# Patient Record
Sex: FEMALE | Race: ASIAN | NOT HISPANIC OR LATINO | Employment: OTHER | URBAN - METROPOLITAN AREA
[De-identification: names, ages, dates, MRNs, and addresses within clinical notes are randomized per-mention and may not be internally consistent; named-entity substitution may affect disease eponyms.]

---

## 2024-04-08 ENCOUNTER — ALLIED HEALTH/NURSE VISIT (OUTPATIENT)
Dept: FAMILY MEDICINE | Facility: CLINIC | Age: 49
End: 2024-04-08
Payer: COMMERCIAL

## 2024-04-08 DIAGNOSIS — Z23 NEED FOR COVID-19 VACCINE: Primary | ICD-10-CM

## 2024-04-08 PROCEDURE — 91320 SARSCV2 VAC 30MCG TRS-SUC IM: CPT

## 2024-04-08 PROCEDURE — 99207 PR NO CHARGE NURSE ONLY: CPT

## 2024-04-08 PROCEDURE — 90480 ADMN SARSCOV2 VAC 1/ONLY CMP: CPT

## 2024-04-08 NOTE — PROGRESS NOTES
Prior to immunization administration, verified patients identity using patient s name and date of birth. Please see Immunization Activity for additional information.     Screening Questionnaire for Adult Immunization    Are you sick today?   No   Do you have allergies to medications, food, a vaccine component or latex?   No   Have you ever had a serious reaction after receiving a vaccination?   No   Do you have a long-term health problem with heart, lung, kidney, or metabolic disease (e.g., diabetes), asthma, a blood disorder, no spleen, complement component deficiency, a cochlear implant, or a spinal fluid leak?  Are you on long-term aspirin therapy?   No   Do you have cancer, leukemia, HIV/AIDS, or any other immune system problem?   No   Do you have a parent, brother, or sister with an immune system problem?   No   In the past 3 months, have you taken medications that affect  your immune system, such as prednisone, other steroids, or anticancer drugs; drugs for the treatment of rheumatoid arthritis, Crohn s disease, or psoriasis; or have you had radiation treatments?   No   Have you had a seizure, or a brain or other nervous system problem?   No   During the past year, have you received a transfusion of blood or blood    products, or been given immune (gamma) globulin or antiviral drug?   No   For women: Are you pregnant or is there a chance you could become       pregnant during the next month?   No   Have you received any vaccinations in the past 4 weeks?   No     Immunization questionnaire answers were all negative.    I have reviewed the following standing orders:   This patient is due and qualifies for the Covid-19 vaccine.     Click here for COVID-19 Standing Order    I have reviewed the vaccines inclusion and exclusion criteria; No concerns regarding eligibility.     Patient instructed to remain in clinic for 15 minutes afterwards, and to report any adverse reactions.     Screening performed by Rose DIALLO  ALONZO Patel on 4/8/2024 at 11:29 AM.

## 2024-08-06 ENCOUNTER — OFFICE VISIT (OUTPATIENT)
Dept: URGENT CARE | Facility: URGENT CARE | Age: 49
End: 2024-08-06
Payer: COMMERCIAL

## 2024-08-06 VITALS
HEART RATE: 73 BPM | WEIGHT: 132 LBS | SYSTOLIC BLOOD PRESSURE: 148 MMHG | RESPIRATION RATE: 22 BRPM | TEMPERATURE: 98.4 F | DIASTOLIC BLOOD PRESSURE: 82 MMHG | OXYGEN SATURATION: 97 %

## 2024-08-06 DIAGNOSIS — I10 BENIGN ESSENTIAL HYPERTENSION: Primary | ICD-10-CM

## 2024-08-06 PROCEDURE — 99203 OFFICE O/P NEW LOW 30 MIN: CPT | Performed by: PHYSICIAN ASSISTANT

## 2024-08-06 RX ORDER — LOSARTAN POTASSIUM 50 MG/1
50 TABLET ORAL DAILY
Qty: 90 TABLET | Refills: 3 | Status: SHIPPED | OUTPATIENT
Start: 2024-08-06 | End: 2024-09-23 | Stop reason: DRUGHIGH

## 2024-08-06 RX ORDER — AMLODIPINE BESYLATE 5 MG/1
5 TABLET ORAL DAILY
Qty: 90 TABLET | Refills: 3 | Status: SHIPPED | OUTPATIENT
Start: 2024-08-06 | End: 2024-09-23 | Stop reason: DRUGHIGH

## 2024-08-06 RX ORDER — LOSARTAN POTASSIUM 50 MG/1
50 TABLET ORAL DAILY
COMMUNITY
End: 2024-09-23

## 2024-08-06 NOTE — PROGRESS NOTES
Assessment & Plan:      Problem List Items Addressed This Visit    None  Visit Diagnoses       Benign essential hypertension    -  Primary    Relevant Medications    losartan (COZAAR) 50 MG tablet    losartan (COZAAR) 50 MG tablet    amLODIPine (NORVASC) 5 MG tablet    Other Relevant Orders    PRIMARY CARE FOLLOW-UP SCHEDULING          Medical Decision Making  Patient presents requesting a refill of her blood pressure medications.  Refilled patient's losartan and amlodipine.  Placed referral for follow-up with primary care for recheck of symptoms and further establishing of care in 1 week.  No red flag symptoms today of severe headache or strokelike symptoms.  Discussed treatment and symptomatic care.  Allergies and medication interactions reviewed.  Discussed signs of worsening symptoms and when to follow-up with PCP if no symptom improvement.     Subjective:      History provided by the son who is translating for the patient.  Alexander Bolivar is a 49 year old female here for evaluation of refill of hypertension medications.  Patient's insurance has recently changed and she is currently transferring her care to [facility].  However, she has ran out of her blood pressure medications and has not been able to schedule a visit with primary care in a timely manner.     The following portions of the patient's history were reviewed and updated as appropriate: allergies, current medications, and problem list.     Review of Systems  Pertinent items are noted in HPI.    Allergies  Allergies   Allergen Reactions    Lisinopril Cough       No family history on file.    Social History     Tobacco Use    Smoking status: Not on file    Smokeless tobacco: Not on file   Substance Use Topics    Alcohol use: Not on file        Objective:      BP (!) 148/82   Pulse 73   Temp 98.4  F (36.9  C)   Resp 22   Wt 59.9 kg (132 lb)   SpO2 97%   General appearance - alert, well appearing, and in no distress and non-toxic    The use  of Dragon/[employer]osh[employer] services was used to construct the content of this note; any grammatical errors are non-intentional. Please contact the author directly if you are in need of any clarification.

## 2024-09-17 ENCOUNTER — OFFICE VISIT (OUTPATIENT)
Dept: URGENT CARE | Facility: URGENT CARE | Age: 49
End: 2024-09-17
Payer: COMMERCIAL

## 2024-09-17 VITALS
TEMPERATURE: 98.4 F | OXYGEN SATURATION: 97 % | WEIGHT: 132 LBS | HEART RATE: 82 BPM | RESPIRATION RATE: 16 BRPM | SYSTOLIC BLOOD PRESSURE: 168 MMHG | DIASTOLIC BLOOD PRESSURE: 95 MMHG

## 2024-09-17 DIAGNOSIS — I10 BENIGN ESSENTIAL HYPERTENSION: Primary | ICD-10-CM

## 2024-09-17 PROCEDURE — 99213 OFFICE O/P EST LOW 20 MIN: CPT | Performed by: PHYSICIAN ASSISTANT

## 2024-09-17 NOTE — PROGRESS NOTES
Assessment & Plan:      Problem List Items Addressed This Visit    None  Visit Diagnoses       Benign essential hypertension    -  Primary    Relevant Orders    PRIMARY CARE FOLLOW-UP SCHEDULING          Medical Decision Making  Patient presents with ongoing issues with hypertension.  Recommend patient discontinue previously prescribed hypertension medications until further follow-up with primary care.  No red flag symptoms on today's examination.  Patient is asymptomatic at this time.  Once again, placed a follow-up referral to try and get patient in sooner to her primary care provider.  Recommend further discussed manage treatment of hypertension is needed at that time.     Subjective:      Czech  was required.  Alexander Bolivar is a 49 year old female here for evaluation of hypertension.  Patient was seen by myself in the urgent care on 8/6.  At that time patient was requesting a refill of her blood pressure medications as she had recently run out of her medicines and was in the middle of transitioning her care to [facility].  2 days into taking patient's blood pressure medication she developed headaches and dizziness.  She stopped the medications and symptoms completely resolved.  She is asymptomatic at this time.  She comes in today requesting a change in her blood pressure medications.     The following portions of the patient's history were reviewed and updated as appropriate: allergies, current medications, and problem list.     Review of Systems  Pertinent items are noted in HPI.    Allergies  Allergies   Allergen Reactions    Lisinopril Cough       History reviewed. No pertinent family history.    Social History     Tobacco Use    Smoking status: Never     Passive exposure: Never    Smokeless tobacco: Never   Substance Use Topics    Alcohol use: Not on file        Objective:      BP (!) 168/95   Pulse 82   Temp 98.4  F (36.9  C) (Oral)   Resp 16   Wt 59.9 kg (132 lb)   SpO2 97%    General appearance - alert, well appearing, and in no distress and non-toxic    The use of Dragon/Power[employer] dictation services was used to construct the content of this note; any grammatical errors are non-intentional. Please contact the author directly if you are in need of any clarification.

## 2024-09-23 ENCOUNTER — OFFICE VISIT (OUTPATIENT)
Dept: FAMILY MEDICINE | Facility: CLINIC | Age: 49
End: 2024-09-23
Payer: COMMERCIAL

## 2024-09-23 VITALS
HEART RATE: 83 BPM | RESPIRATION RATE: 16 BRPM | DIASTOLIC BLOOD PRESSURE: 94 MMHG | TEMPERATURE: 98.6 F | SYSTOLIC BLOOD PRESSURE: 158 MMHG | OXYGEN SATURATION: 96 % | HEIGHT: 65 IN | BODY MASS INDEX: 21.99 KG/M2 | WEIGHT: 132 LBS

## 2024-09-23 DIAGNOSIS — Z13.220 LIPID SCREENING: ICD-10-CM

## 2024-09-23 DIAGNOSIS — I10 BENIGN ESSENTIAL HYPERTENSION: Primary | ICD-10-CM

## 2024-09-23 DIAGNOSIS — Z12.11 SCREEN FOR COLON CANCER: ICD-10-CM

## 2024-09-23 DIAGNOSIS — Z13.1 SCREENING FOR DIABETES MELLITUS: ICD-10-CM

## 2024-09-23 DIAGNOSIS — Z12.31 VISIT FOR SCREENING MAMMOGRAM: ICD-10-CM

## 2024-09-23 LAB
ANION GAP SERPL CALCULATED.3IONS-SCNC: 11 MMOL/L (ref 7–15)
BUN SERPL-MCNC: 14.3 MG/DL (ref 6–20)
CALCIUM SERPL-MCNC: 8.7 MG/DL (ref 8.8–10.4)
CHLORIDE SERPL-SCNC: 103 MMOL/L (ref 98–107)
CHOLEST SERPL-MCNC: 190 MG/DL
CREAT SERPL-MCNC: 0.72 MG/DL (ref 0.51–0.95)
EGFRCR SERPLBLD CKD-EPI 2021: >90 ML/MIN/1.73M2
EST. AVERAGE GLUCOSE BLD GHB EST-MCNC: 117 MG/DL
FASTING STATUS PATIENT QL REPORTED: NO
FASTING STATUS PATIENT QL REPORTED: NO
GLUCOSE SERPL-MCNC: 96 MG/DL (ref 70–99)
HBA1C MFR BLD: 5.7 % (ref 0–5.6)
HCO3 SERPL-SCNC: 25 MMOL/L (ref 22–29)
HDLC SERPL-MCNC: 50 MG/DL
LDLC SERPL CALC-MCNC: 93 MG/DL
NONHDLC SERPL-MCNC: 140 MG/DL
POTASSIUM SERPL-SCNC: 3.2 MMOL/L (ref 3.4–5.3)
SODIUM SERPL-SCNC: 139 MMOL/L (ref 135–145)
TRIGL SERPL-MCNC: 233 MG/DL

## 2024-09-23 PROCEDURE — 99213 OFFICE O/P EST LOW 20 MIN: CPT | Performed by: FAMILY MEDICINE

## 2024-09-23 PROCEDURE — 80061 LIPID PANEL: CPT | Performed by: FAMILY MEDICINE

## 2024-09-23 PROCEDURE — 36415 COLL VENOUS BLD VENIPUNCTURE: CPT | Performed by: FAMILY MEDICINE

## 2024-09-23 PROCEDURE — 83036 HEMOGLOBIN GLYCOSYLATED A1C: CPT | Performed by: FAMILY MEDICINE

## 2024-09-23 PROCEDURE — 80048 BASIC METABOLIC PNL TOTAL CA: CPT | Performed by: FAMILY MEDICINE

## 2024-09-23 RX ORDER — LOSARTAN POTASSIUM AND HYDROCHLOROTHIAZIDE 12.5; 5 MG/1; MG/1
1 TABLET ORAL DAILY
Qty: 90 TABLET | Refills: 0 | Status: SHIPPED | OUTPATIENT
Start: 2024-09-23

## 2024-09-23 NOTE — PROGRESS NOTES
Assessment & Plan     (I10) Benign essential hypertension  (primary encounter diagnosis)  Comment: Blood pressure above goal   Recommend 1 tab daily     Recommend hydration   Recommend low salt diet .    Plan: BASIC METABOLIC PANEL,         losartan-hydrochlorothiazide (HYZAAR) 50-12.5         MG tablet            (Z12.31) Visit for screening mammogram  Comment:   Plan: MA Screening Bilateral w/ Trino            (Z12.11) Screen for colon cancer  Comment:   Plan: Fecal colorectal cancer screen (FIT)            (Z13.220) Lipid screening  Comment:   Plan: Lipid panel reflex to direct LDL Non-fasting            (Z13.1) Screening for diabetes mellitus  Comment:   Plan: Hemoglobin A1c              Subjective   Alexander is a 49 year old, presenting for the following health issues:  Urgent Care (- Follow Up; Hypertension)        9/23/2024    11:03 AM   Additional Questions   Roomed by MARIXA Sierra   Accompanied by Liu - Son In Law     HPI       ED/UC Followup:    Facility:  [facility]  Date of visit: 09/17/2024  Reason for visit: Hypertension  Current Status: No Improvement      Review of Systems  CONSTITUTIONAL: NEGATIVE for fever, chills, change in weight  INTEGUMENTARY/SKIN: NEGATIVE for worrisome rashes, moles or lesions  EYES: NEGATIVE for vision changes or irritation  ENT/MOUTH: NEGATIVE for ear, mouth and throat problems  RESP: NEGATIVE for significant cough or SOB  BREAST: NEGATIVE for masses, tenderness or discharge  CV: NEGATIVE for chest pain, palpitations or peripheral edema  GI: NEGATIVE for nausea, abdominal pain, heartburn, or change in bowel habits  : NEGATIVE for frequency, dysuria, or hematuria  MUSCULOSKELETAL: NEGATIVE for significant arthralgias or myalgia  NEURO: NEGATIVE for weakness, dizziness or paresthesias  ENDOCRINE: NEGATIVE for temperature intolerance, skin/hair changes  HEME: NEGATIVE for bleeding problems  PSYCHIATRIC: NEGATIVE for changes in mood or affect      Objective    BP (!)  "158/94 (BP Location: Right arm, Patient Position: Sitting, Cuff Size: Adult Large)   Pulse 83   Temp 98.6  F (37  C) (Oral)   Resp 16   Ht 1.651 m (5' 5\")   Wt 59.9 kg (132 lb)   LMP  (LMP Unknown)   SpO2 96%   Breastfeeding No   BMI 21.97 kg/m    Body mass index is 21.97 kg/m .  Physical Exam   GENERAL: alert and no distress  RESP: lungs clear to auscultation - no rales, rhonchi or wheezes  CV: regular rate and rhythm, normal S1 S2, no S3 or S4, no murmur, click or rub, no peripheral edema  ABDOMEN: soft, nontender, no hepatosplenomegaly, no masses and bowel sounds normal  MS: no gross musculoskeletal defects noted, no edema  SKIN: no suspicious lesions or rashes  NEURO: Normal strength and tone, mentation intact and speech normal  PSYCH: mentation appears normal, affect normal/bright            Signed Electronically by: Violette Fritz MD    "

## 2024-09-24 DIAGNOSIS — E87.6 HYPOKALEMIA: Primary | ICD-10-CM

## 2024-09-25 ENCOUNTER — TELEPHONE (OUTPATIENT)
Dept: FAMILY MEDICINE | Facility: CLINIC | Age: 49
End: 2024-09-25
Payer: COMMERCIAL

## 2024-09-25 NOTE — LETTER
September 27, 2024      Alexander Bolivar  [id number] [address] [facility] [address]        Dear ,    We are writing to inform you of your test results.    Test results indicate you may require additional follow up, see comment below.    We have tried to reach you about your labs but have not heard back from you.       Your potassium is low and I recommend you add potassium rich foods to your diet.  I have also added a new blood pressure medication that I have sent to your pharmacy. We should recheck this lab again in 2 months.    Your A1c shows you are now in the pre diabetic range please work on  diet and exercise.     You can call the number above with any questions and to schedule the follow up lab.     If you have any questions or concerns, please call the clinic at the number listed above.       Sincerely,    Violette Fritz MD/amp

## 2024-09-25 NOTE — TELEPHONE ENCOUNTER
LM for call back via interSHARON Huynh     Please inform patient potassium low , recommend adding additional potassium rich food .  Recommend rechecking potassium in 2 months as I have added new blood pressure medication .  A1C prediabetic range , recommend low cholesterol and low sugar diet .     Thanks  Violette Fritz MD.

## 2024-09-26 ENCOUNTER — APPOINTMENT (OUTPATIENT)
Dept: INTERPRETER SERVICES | Facility: CLINIC | Age: 49
End: 2024-09-26
Payer: COMMERCIAL

## 2024-09-30 ENCOUNTER — ANCILLARY PROCEDURE (OUTPATIENT)
Dept: MAMMOGRAPHY | Facility: CLINIC | Age: 49
End: 2024-09-30
Payer: COMMERCIAL

## 2024-09-30 DIAGNOSIS — Z12.31 VISIT FOR SCREENING MAMMOGRAM: ICD-10-CM

## 2024-09-30 PROCEDURE — 77063 BREAST TOMOSYNTHESIS BI: CPT | Mod: TC | Performed by: STUDENT IN AN ORGANIZED HEALTH CARE EDUCATION/TRAINING PROGRAM

## 2024-09-30 PROCEDURE — 77067 SCR MAMMO BI INCL CAD: CPT | Mod: TC | Performed by: STUDENT IN AN ORGANIZED HEALTH CARE EDUCATION/TRAINING PROGRAM

## 2024-10-11 ENCOUNTER — OFFICE VISIT (OUTPATIENT)
Dept: FAMILY MEDICINE | Facility: CLINIC | Age: 49
End: 2024-10-11
Payer: COMMERCIAL

## 2024-10-11 VITALS
SYSTOLIC BLOOD PRESSURE: 134 MMHG | BODY MASS INDEX: 25.9 KG/M2 | DIASTOLIC BLOOD PRESSURE: 88 MMHG | OXYGEN SATURATION: 97 % | HEIGHT: 60 IN | TEMPERATURE: 97.5 F | WEIGHT: 131.9 LBS | HEART RATE: 85 BPM | RESPIRATION RATE: 16 BRPM

## 2024-10-11 DIAGNOSIS — Z00.00 WELL ADULT EXAM: ICD-10-CM

## 2024-10-11 DIAGNOSIS — Z23 ENCOUNTER FOR IMMUNIZATION: ICD-10-CM

## 2024-10-11 DIAGNOSIS — I10 BENIGN ESSENTIAL HYPERTENSION: ICD-10-CM

## 2024-10-11 PROCEDURE — 90471 IMMUNIZATION ADMIN: CPT | Performed by: FAMILY MEDICINE

## 2024-10-11 PROCEDURE — 99396 PREV VISIT EST AGE 40-64: CPT | Mod: 25 | Performed by: FAMILY MEDICINE

## 2024-10-11 PROCEDURE — 90673 RIV3 VACCINE NO PRESERV IM: CPT | Performed by: FAMILY MEDICINE

## 2024-10-11 RX ORDER — LOSARTAN POTASSIUM AND HYDROCHLOROTHIAZIDE 12.5; 5 MG/1; MG/1
1 TABLET ORAL DAILY
Qty: 90 TABLET | Refills: 3 | Status: SHIPPED | OUTPATIENT
Start: 2024-10-11

## 2024-10-11 SDOH — HEALTH STABILITY: PHYSICAL HEALTH: ON AVERAGE, HOW MANY DAYS PER WEEK DO YOU ENGAGE IN MODERATE TO STRENUOUS EXERCISE (LIKE A BRISK WALK)?: 7 DAYS

## 2024-10-11 ASSESSMENT — PAIN SCALES - GENERAL: PAINLEVEL: NO PAIN (0)

## 2024-10-11 ASSESSMENT — SOCIAL DETERMINANTS OF HEALTH (SDOH): HOW OFTEN DO YOU GET TOGETHER WITH FRIENDS OR RELATIVES?: NEVER

## 2024-10-11 NOTE — PROGRESS NOTES
"Preventive Care Visit  [facility]  Violette Fritz MD, Family Medicine  Oct 11, 2024      Assessment & Plan     (Z00.00) Well adult exam  Comment: Discussed healthy eating   Plan: Discussed maintaining ideal weight   Discussed regular exercise .  Blood pressure     History of hysterectomy no pap needed .    (I10) Benign essential hypertension  Comment: blood pressure better controlled .   Potassium mildly low   Discussed diet   Recheck in 1 month   Plan: Basic metabolic panel  (Ca, Cl, CO2, Creat,         Gluc, K, Na, BUN), losartan-hydrochlorothiazide        (HYZAAR) 50-12.5 MG tablet            (Z23) Encounter for immunization  Comment:   Plan: INFLUENZA VACCINE TRIVALENT(FLUBLOK)            BMI  Estimated body mass index is 26.19 kg/m  as calculated from the following:    Height as of this encounter: 1.511 m (4' 11.5\").    Weight as of this encounter: 59.8 kg (131 lb 14.4 oz).   Weight management plan: Discussed healthy diet and exercise guidelines    Counseling  Appropriate preventive services were addressed with this patient via screening, questionnaire, or discussion as appropriate for fall prevention, nutrition, physical activity, Tobacco-use cessation, social engagement, weight loss and cognition.  Checklist reviewing preventive services available has been given to the patient.  Reviewed patient's diet, addressing concerns and/or questions.   Patient is at risk for social isolation and has been provided with information about the benefit of social connection.   The patient was instructed to see the dentist every 6 months.         Deb Munoz is a 49 year old, presenting for the following:  Physical (fasting)        10/11/2024     6:52 AM   Additional Questions   Roomed by Donita   Accompanied by self        Health Care Directive  Patient does not have a Health Care Directive or Living Will: Discussed advance care planning with patient; information given to patient to " review.    HPI          10/11/2024   General Health   How would you rate your overall physical health? (!) FAIR   Feel stress (tense, anxious, or unable to sleep) To some extent      (!) STRESS CONCERN      10/11/2024   Nutrition   Three or more servings of calcium each day? (!) I DON'T KNOW   Diet: Regular (no restrictions)   How many servings of fruit and vegetables per day? (!) 0-1   How many sweetened beverages each day? 0-1            10/11/2024   Exercise   Days per week of moderate/strenous exercise 7 days            10/11/2024   Social Factors   Frequency of gathering with friends or relatives Never   Worry food won't last until get money to buy more No   Food not last or not have enough money for food? No   Do you have housing? (Housing is defined as stable permanent housing and does not include staying ouside in a car, in a tent, in an abandoned building, in an overnight shelter, or couch-surfing.) Yes   Are you worried about losing your housing? No   Lack of transportation? No   Unable to get utilities (heat,electricity)? No      (!) SOCIAL CONNECTIONS CONCERN      10/11/2024   Fall Risk   Fallen 2 or more times in the past year? No   Trouble with walking or balance? No             10/11/2024   Dental   Dentist two times every year? (!) NO            10/11/2024   TB Screening   Were you born outside of the [place]? Yes              Today's PHQ-2 Score:       9/23/2024    11:03 AM   PHQ-2 ( 1999 Pf[employer])   Q1: Little interest or pleasure in doing things 0   Q2: Feeling down, depressed or hopeless 0   PHQ-2 Score 0         10/11/2024   Substance Use   Alcohol more than 3/day or more than 7/wk No   Do you use any other substances recreationally? No        Social History     Tobacco Use    Smoking status: Never     Passive exposure: Never    Smokeless tobacco: Never   Vaping Use    Vaping status: Never Used           9/30/2024   LAST FHS-7 RESULTS   1st degree relative breast or ovarian cancer No    No   Any  relative bilateral breast cancer No    No   Any male have breast cancer No    No   Any ONE woman have BOTH breast AND ovarian cancer No    No   Any woman with breast cancer before 50yrs No    No   2 or more relatives with breast AND/OR ovarian cancer No    No   2 or more relatives with breast AND/OR bowel cancer No    No       Multiple values from one day are sorted in reverse-chronological order        Mammogram Screening - Mammogram every 1-2 years updated in Health Maintenance based on mutual decision making          10/11/2024   One time HIV Screening   Previous HIV test? No          10/11/2024   STI Screening   New sexual partner(s) since last STI/HIV test? No        History of abnormal Pap smear: No - age 30- 64 PAP with HPV every 5 years recommended       ASCVD Risk   The 10-year ASCVD risk score (Gilberto KRAMER, et al., 2019) is: 1.9%    Values used to calculate the score:      Age: 49 years      Sex: Female      Is Non- : No      Diabetic: No      Tobacco smoker: No      Systolic Blood Pressure: 133 mmHg      Is BP treated: Yes      HDL Cholesterol: 50 mg/dL      Total Cholesterol: 190 mg/dL         Reviewed and updated as needed this visit by Provider                    No past medical history on file.  Past Surgical History:   Procedure Laterality Date    HYSTERECTOMY, PAP NO LONGER INDICATED           Review of Systems  CONSTITUTIONAL: NEGATIVE for fever, chills, change in weight  INTEGUMENTARY/SKIN: NEGATIVE for worrisome rashes, moles or lesions  EYES: NEGATIVE for vision changes or irritation  ENT/MOUTH: NEGATIVE for ear, mouth and throat problems  RESP: NEGATIVE for significant cough or SOB  BREAST: NEGATIVE for masses, tenderness or discharge  CV: NEGATIVE for chest pain, palpitations or peripheral edema  GI: NEGATIVE for nausea, abdominal pain, heartburn, or change in bowel habits  : NEGATIVE for frequency, dysuria, or hematuria  MUSCULOSKELETAL: NEGATIVE for  "significant arthralgias or myalgia  NEURO: NEGATIVE for weakness, dizziness or paresthesias  ENDOCRINE: NEGATIVE for temperature intolerance, skin/hair changes  HEME: NEGATIVE for bleeding problems  PSYCHIATRIC: NEGATIVE for changes in mood or affect     Objective    Exam  BP (!) 133/90 (BP Location: Right arm, Patient Position: Sitting, Cuff Size: Adult Regular)   Pulse 85   Temp 97.5  F (36.4  C) (Oral)   Resp 16   Ht 1.511 m (4' 11.5\")   Wt 59.8 kg (131 lb 14.4 oz)   LMP  (LMP Unknown)   SpO2 97%   BMI 26.19 kg/m     Estimated body mass index is 26.19 kg/m  as calculated from the following:    Height as of this encounter: 1.511 m (4' 11.5\").    Weight as of this encounter: 59.8 kg (131 lb 14.4 oz).    Physical Exam  GENERAL: alert and no distress  EYES: Eyes grossly normal to inspection, PERRL and conjunctivae and sclerae normal  HENT: ear canals and TM's normal, nose and mouth without ulcers or lesions  NECK: no adenopathy, no asymmetry, masses, or scars  RESP: lungs clear to auscultation - no rales, rhonchi or wheezes  CV: regular rate and rhythm, normal S1 S2, no S3 or S4, no murmur, click or rub, no peripheral edema  ABDOMEN: soft, nontender, no hepatosplenomegaly, no masses and bowel sounds normal  MS: no gross musculoskeletal defects noted, no edema  SKIN: no suspicious lesions or rashes  NEURO: Normal strength and tone, mentation intact and speech normal  PSYCH: mentation appears normal, affect normal/bright        Signed Electronically by: Violette Fritz MD    "

## 2025-01-07 ENCOUNTER — OFFICE VISIT (OUTPATIENT)
Dept: FAMILY MEDICINE | Facility: CLINIC | Age: 50
End: 2025-01-07
Payer: COMMERCIAL

## 2025-01-07 VITALS
DIASTOLIC BLOOD PRESSURE: 86 MMHG | HEART RATE: 79 BPM | RESPIRATION RATE: 20 BRPM | TEMPERATURE: 97.8 F | SYSTOLIC BLOOD PRESSURE: 137 MMHG | OXYGEN SATURATION: 98 % | HEIGHT: 60 IN | BODY MASS INDEX: 26.97 KG/M2 | WEIGHT: 137.4 LBS

## 2025-01-07 DIAGNOSIS — Z78.9 ALCOHOL USE: ICD-10-CM

## 2025-01-07 DIAGNOSIS — R53.83 OTHER FATIGUE: ICD-10-CM

## 2025-01-07 DIAGNOSIS — R73.03 PREDIABETES: ICD-10-CM

## 2025-01-07 DIAGNOSIS — F43.21 ADJUSTMENT DISORDER WITH DEPRESSED MOOD: ICD-10-CM

## 2025-01-07 DIAGNOSIS — I10 BENIGN ESSENTIAL HYPERTENSION: Primary | ICD-10-CM

## 2025-01-07 DIAGNOSIS — E87.6 HYPOKALEMIA: ICD-10-CM

## 2025-01-07 LAB
ALBUMIN SERPL BCG-MCNC: 4.5 G/DL (ref 3.5–5.2)
ALP SERPL-CCNC: 54 U/L (ref 40–150)
ALT SERPL W P-5'-P-CCNC: 39 U/L (ref 0–50)
ANION GAP SERPL CALCULATED.3IONS-SCNC: 12 MMOL/L (ref 7–15)
AST SERPL W P-5'-P-CCNC: 33 U/L (ref 0–45)
BILIRUB SERPL-MCNC: 0.2 MG/DL
BUN SERPL-MCNC: 10.1 MG/DL (ref 6–20)
CALCIUM SERPL-MCNC: 9.5 MG/DL (ref 8.8–10.4)
CHLORIDE SERPL-SCNC: 102 MMOL/L (ref 98–107)
CREAT SERPL-MCNC: 0.63 MG/DL (ref 0.51–0.95)
EGFRCR SERPLBLD CKD-EPI 2021: >90 ML/MIN/1.73M2
EST. AVERAGE GLUCOSE BLD GHB EST-MCNC: 123 MG/DL
GLUCOSE SERPL-MCNC: 83 MG/DL (ref 70–99)
HBA1C MFR BLD: 5.9 % (ref 0–5.6)
HCO3 SERPL-SCNC: 25 MMOL/L (ref 22–29)
POTASSIUM SERPL-SCNC: 3.6 MMOL/L (ref 3.4–5.3)
PROT SERPL-MCNC: 7.6 G/DL (ref 6.4–8.3)
SODIUM SERPL-SCNC: 139 MMOL/L (ref 135–145)
TSH SERPL DL<=0.005 MIU/L-ACNC: 1 UIU/ML (ref 0.3–4.2)
VIT D+METAB SERPL-MCNC: 35 NG/ML (ref 20–50)

## 2025-01-07 PROCEDURE — G2211 COMPLEX E/M VISIT ADD ON: HCPCS | Performed by: FAMILY MEDICINE

## 2025-01-07 PROCEDURE — 83036 HEMOGLOBIN GLYCOSYLATED A1C: CPT | Performed by: FAMILY MEDICINE

## 2025-01-07 PROCEDURE — 99214 OFFICE O/P EST MOD 30 MIN: CPT | Performed by: FAMILY MEDICINE

## 2025-01-07 PROCEDURE — 36415 COLL VENOUS BLD VENIPUNCTURE: CPT | Performed by: FAMILY MEDICINE

## 2025-01-07 PROCEDURE — 80053 COMPREHEN METABOLIC PANEL: CPT | Performed by: FAMILY MEDICINE

## 2025-01-07 PROCEDURE — T1013 SIGN LANG/ORAL INTERPRETER: HCPCS | Mod: U3

## 2025-01-07 PROCEDURE — 84443 ASSAY THYROID STIM HORMONE: CPT | Performed by: FAMILY MEDICINE

## 2025-01-07 PROCEDURE — 82306 VITAMIN D 25 HYDROXY: CPT | Performed by: FAMILY MEDICINE

## 2025-01-07 RX ORDER — BUPROPION HYDROCHLORIDE 150 MG/1
150 TABLET ORAL EVERY MORNING
Qty: 30 TABLET | Refills: 1 | Status: SHIPPED | OUTPATIENT
Start: 2025-01-07

## 2025-01-07 RX ORDER — LOSARTAN POTASSIUM AND HYDROCHLOROTHIAZIDE 12.5; 5 MG/1; MG/1
1 TABLET ORAL DAILY
Qty: 90 TABLET | Refills: 3 | Status: SHIPPED | OUTPATIENT
Start: 2025-01-07

## 2025-01-07 NOTE — PROGRESS NOTES
Assessment & Plan     Benign essential hypertension  Controlled, last BMP with hypokalemia, recommend recheck.  - losartan-hydrochlorothiazide (HYZAAR) 50-12.5 MG tablet  Dispense: 90 tablet; Refill: 3    Other fatigue  Screening for thyroid disorder and low vitamin D.  Additional as per below.  - Vitamin D Deficiency  - TSH with free T4 reflex    Prediabetes  Last A1c 5.7, diet exercise counseling performed, recheck A1c today.  - Hemoglobin A1c    Hypokalemia  Recheck levels.  - Comprehensive metabolic panel (BMP + Alb, Alk Phos, ALT, AST, Total. Bili, TP)    Alcohol use  History of heavy alcohol use in [place], recheck metabolic panel.  - Comprehensive metabolic panel (BMP + Alb, Alk Phos, ALT, AST, Total. Bili, TP)    Adjustment disorder with depressed mood  No history of seizures, start Wellbutrin, follow-up with me in 1 month for recheck.  Monitor for xiomara and insomnia.  - buPROPion (WELLBUTRIN XL) 150 MG 24 hr tablet  Dispense: 30 tablet; Refill: 1                  Deb Munoz is a 49 year old, presenting for the following health issues:  Recheck Medication (BP follow up)      1/7/2025    12:46 PM   Additional Questions   Roomed by Donita   Accompanied by son in law Liu Mimi and his daughter     History of Present Illness       Reason for visit:  Medicine          Medication Followup of Losartan  Taking Medication as prescribed: yes  Side Effects:  None  Medication Helping Symptoms:  yes    Hypertension Follow-up    Do you check your blood pressure regularly outside of the clinic? No   Are you following a low salt diet? Yes  Are your blood pressures ever more than 140 on the top number (systolic) OR more   than 90 on the bottom number (diastolic), for example 140/90? No  How many servings of fruits and vegetables do you eat daily?  2-3  On average, how many sweetened beverages do you drink each day (Examples: soda, juice, sweet tea, etc.  Do NOT count diet or artificially sweetened beverages)?   1  "coffee with sugar  How many days per week do you exercise enough to make your heart beat faster? 3 or less  How many minutes a day do you exercise enough to make your heart beat faster? 30 - 60  How many days per week do you miss taking your medication? 0      Patient with concerns for depression, moved from [place] 5 years ago and finds her self very lonely isolated unable to drive and stuck at home usually watching her grandkids she find depressed mood, fatigue, weight gain.  History of seizures.      Objective    /86 (BP Location: Right arm, Patient Position: Sitting, Cuff Size: Adult Regular)   Pulse 79   Temp 97.8  F (36.6  C) (Oral)   Resp 20   Ht 1.511 m (4' 11.5\")   Wt 62.3 kg (137 lb 6.4 oz)   LMP  (LMP Unknown)   SpO2 98%   BMI 27.29 kg/m    Body mass index is 27.29 kg/m .      Physical Exam  Vitals reviewed.   Constitutional:       Appearance: She is not ill-appearing.   Cardiovascular:      Rate and Rhythm: Normal rate and regular rhythm.   Pulmonary:      Effort: Pulmonary effort is normal.      Breath sounds: Normal breath sounds.   Psychiatric:         Thought Content: Thought content normal.         Judgment: Judgment normal.        Hemoglobin A1C   Date Value Ref Range Status   09/23/2024 5.7 (H) 0.0 - 5.6 % Final     Comment:     Normal <5.7%   Prediabetes 5.7-6.4%    Diabetes 6.5% or higher     Note: Adopted from ADA consensus guidelines.     Last Comprehensive Metabolic Panel:  Lab Results   Component Value Date     09/23/2024    POTASSIUM 3.2 (L) 09/23/2024    CHLORIDE 103 09/23/2024    CO2 25 09/23/2024    ANIONGAP 11 09/23/2024    GLC 96 09/23/2024    BUN 14.3 09/23/2024    CR 0.72 09/23/2024    GFRESTIMATED >90 09/23/2024    MONICA 8.7 (L) 09/23/2024               Signed Electronically by: Raman Sesay MD    "

## 2025-02-27 ENCOUNTER — VIRTUAL VISIT (OUTPATIENT)
Dept: FAMILY MEDICINE | Facility: CLINIC | Age: 50
End: 2025-02-27
Payer: COMMERCIAL

## 2025-02-27 DIAGNOSIS — F43.21 ADJUSTMENT DISORDER WITH DEPRESSED MOOD: Primary | ICD-10-CM

## 2025-02-27 DIAGNOSIS — Z53.20 COLON CANCER SCREENING DECLINED: ICD-10-CM

## 2025-02-27 NOTE — PROGRESS NOTES
Alexander is a 49 year old who is being evaluated via a billable video visit.    How would you like to obtain your AVS? MyChart  If the video visit is dropped, the invitation should be resent by: Text to cell phone: [phone]  Will anyone else be joining your video visit? Yes: son in law Liu Dunn. How would they like to receive their invitation? Text to cell phone: [phone]      Assessment & Plan     Adjustment disorder with depressed mood  Has dropped Wellbutrin, unfortunately had palpitations.  Self discontinued, patient declined additional adult services at this time.    Colon cancer screening declined  Offered colon cancer screening, reviewed risk of colon cancer which can be fatal, patient declined.            Subjective   Alexander is a 49 year old, presenting for the following health issues:  RECHECK (Follow up HTN)      2/27/2025    11:39 AM   Additional Questions   Roomed by Donita   Accompanied by son in law Liu Dunn     Video Start Time: 11:48 AM    History of Present Illness       Hypertension: She presents for follow up of hypertension.  She does check blood pressure  regularly outside of the clinic. Outpatient blood pressures have not been over 140/90. She follows a low salt diet.     She eats 2-3 servings of fruits and vegetables daily.She consumes 0 sweetened beverage(s) daily.She exercises with enough effort to increase her heart rate 9 or less minutes per day.  She exercises with enough effort to increase her heart rate 3 or less days per week.   She is taking medications regularly.      Interval mental health follow-up for acute adjustment disorder.  Patient trialed Wellbutrin, noted to give her palpitations self discontinued.  She would like to discontinue this therapy.          Objective           Vitals:  No vitals were obtained today due to virtual visit.    Physical Exam   GENERAL: alert and no distress  EYES: Eyes grossly normal to inspection.  No discharge or erythema, or obvious  scleral/conjunctival abnormalities.  RESP: No audible wheeze, cough, or visible cyanosis.    SKIN: Visible skin clear. No significant rash, abnormal pigmentation or lesions.  NEURO: Cranial nerves grossly intact.  Mentation and speech appropriate for age.  PSYCH: Appropriate affect, tone, and pace of words          Video-Visit Details    Type of service:  Video Visit   Video End Time:12:05 PM  Originating Location (pt. Location): Home    Distant Location (provider location):  On-site  Platform used for Video Visit: José Miguel  Signed Electronically by: Raman Sesay MD

## 2025-03-04 DIAGNOSIS — I10 BENIGN ESSENTIAL HYPERTENSION: ICD-10-CM

## 2025-03-04 RX ORDER — LOSARTAN POTASSIUM AND HYDROCHLOROTHIAZIDE 12.5; 5 MG/1; MG/1
1 TABLET ORAL DAILY
Qty: 90 TABLET | Refills: 3 | Status: CANCELLED | OUTPATIENT
Start: 2025-03-04

## 2025-03-04 NOTE — TELEPHONE ENCOUNTER
Left voicemail pharmacy message for return call to discuss as unable to talk with a pharmacist directly.    Syeda PRO, RN, PHN

## 2025-03-04 NOTE — TELEPHONE ENCOUNTER
There should be refills on file, please contact pharmacy. If pharmacy does need new refills, please route back to refill team.    Thank you,  Giovani, Triage SHARON Espinosa    12:50 PM 3/4/2025

## 2025-03-04 NOTE — TELEPHONE ENCOUNTER
Medication Question or Refill        What medication are you calling about (include dose and sig)?: losartan-hydrochlorothiazide (HYZAAR) 50-12.5 MG tablet     Preferred Pharmacy:  [facility]/pharmacy #[id number] - [facility] [address] [address] [facility] [facility]  [address] [facility] [address]  Phone: [phone] Fax: [phone]      Controlled Substance Agreement on file:   CSA -- Patient Level:    CSA: None found at the patient level.       Who prescribed the medication?: Raman Sesay    Do you need a refill? Yes    When did you use the medication last? Current    Patient offered an appointment? No    Do you have any questions or concerns?  Pt stated [facility] has no refills and is requesting a new prescription      [facility] or would you prefer to receive a phone call?:   Patient would prefer a phone call   Okay to leave a detailed message?: Yes at Cell number on file:    Telephone Information:   Mobile [phone]

## 2025-03-04 NOTE — TELEPHONE ENCOUNTER
Pharmacy calling back, Patient has refills on file, not sure why patient thinks they don't have it she says, it was returned to stock because it sat on shelf too long. They will push it through. Marlene Luque R.N.         within functional limits

## 2025-03-06 NOTE — TELEPHONE ENCOUNTER
Patients son called back, let him know she needs to call pharmacy, they have refills on file     Christy Mas/